# Patient Record
Sex: MALE | Race: BLACK OR AFRICAN AMERICAN | URBAN - METROPOLITAN AREA
[De-identification: names, ages, dates, MRNs, and addresses within clinical notes are randomized per-mention and may not be internally consistent; named-entity substitution may affect disease eponyms.]

---

## 2019-02-08 ENCOUNTER — APPOINTMENT (RX ONLY)
Dept: URBAN - METROPOLITAN AREA CLINIC 64 | Facility: CLINIC | Age: 29
Setting detail: DERMATOLOGY
End: 2019-02-08

## 2019-02-08 DIAGNOSIS — R21 RASH AND OTHER NONSPECIFIC SKIN ERUPTION: ICD-10-CM

## 2019-02-08 PROBLEM — L20.84 INTRINSIC (ALLERGIC) ECZEMA: Status: ACTIVE | Noted: 2019-02-08

## 2019-02-08 PROBLEM — L85.3 XEROSIS CUTIS: Status: ACTIVE | Noted: 2019-02-08

## 2019-02-08 PROBLEM — J45.909 UNSPECIFIED ASTHMA, UNCOMPLICATED: Status: ACTIVE | Noted: 2019-02-08

## 2019-02-08 PROCEDURE — ? COUNSELING

## 2019-02-08 PROCEDURE — ? BIOPSY BY PUNCH METHOD

## 2019-02-08 PROCEDURE — ? PRESCRIPTION

## 2019-02-08 PROCEDURE — 11105 PUNCH BX SKIN EA SEP/ADDL: CPT

## 2019-02-08 PROCEDURE — 11104 PUNCH BX SKIN SINGLE LESION: CPT

## 2019-02-08 RX ORDER — MUPIROCIN 20 MG/G
OINTMENT TOPICAL
Qty: 1 | Refills: 4 | Status: ERX | COMMUNITY
Start: 2019-02-08

## 2019-02-08 RX ORDER — TRIAMCINOLONE ACETONIDE 1 MG/G
CREAM TOPICAL BID
Qty: 1 | Refills: 4 | Status: ERX | COMMUNITY
Start: 2019-02-08

## 2019-02-08 RX ADMIN — TRIAMCINOLONE ACETONIDE 1: 1 CREAM TOPICAL at 00:00

## 2019-02-08 RX ADMIN — MUPIROCIN 1: 20 OINTMENT TOPICAL at 00:00

## 2019-02-08 ASSESSMENT — LOCATION DETAILED DESCRIPTION DERM
LOCATION DETAILED: RIGHT ANTERIOR PROXIMAL THIGH
LOCATION DETAILED: RIGHT DISTAL CALF
LOCATION DETAILED: RIGHT PROXIMAL CALF
LOCATION DETAILED: LEFT PROXIMAL CALF
LOCATION DETAILED: LEFT ANTECUBITAL SKIN
LOCATION DETAILED: RIGHT VENTRAL PROXIMAL FOREARM

## 2019-02-08 ASSESSMENT — LOCATION SIMPLE DESCRIPTION DERM
LOCATION SIMPLE: RIGHT THIGH
LOCATION SIMPLE: LEFT CALF
LOCATION SIMPLE: LEFT ELBOW
LOCATION SIMPLE: RIGHT CALF
LOCATION SIMPLE: RIGHT FOREARM

## 2019-02-08 ASSESSMENT — LOCATION ZONE DERM
LOCATION ZONE: ARM
LOCATION ZONE: LEG

## 2019-02-08 NOTE — PROCEDURE: BIOPSY BY PUNCH METHOD
Anesthesia Volume In Cc (Will Not Render If 0): 0.5
Post-Care Instructions: I reviewed with the patient in detail post-care instructions. Patient is to keep the biopsy site dry overnight, and then apply bacitracin twice daily until healed. Patient may apply hydrogen peroxide soaks to remove any crusting.
Detail Level: Detailed
X Depth Of Punch In Cm (Optional): 0
Epidermal Sutures: 6-0 Ethilon
Punch Size In Mm: 5
Wound Care: Petrolatum
Bill For Surgical Tray: no
Hemostasis: None
Was A Bandage Applied: Yes
Biopsy Type: H and E
Home Suture Removal Text: Patient was provided a home suture removal kit and will remove their sutures at home.  If they have any questions or difficulties they will call the office.
Notification Instructions: Patient will be notified of biopsy results. However, patient instructed to call the office if not contacted within 2 weeks.
Consent: Written consent was obtained and risks were reviewed including but not limited to scarring, infection, bleeding, scabbing, incomplete removal, nerve damage and allergy to anesthesia.
Lab: 6
Lab Facility: 3
Dressing: bandage
Anesthesia Type: 1% lidocaine with epinephrine
Billing Type: Third-Party Bill
Suture Removal: 14 days

## 2019-04-12 ENCOUNTER — APPOINTMENT (RX ONLY)
Dept: URBAN - METROPOLITAN AREA CLINIC 64 | Facility: CLINIC | Age: 29
Setting detail: DERMATOLOGY
End: 2019-04-12

## 2019-04-12 DIAGNOSIS — L40.8 OTHER PSORIASIS: ICD-10-CM

## 2019-04-12 PROCEDURE — ? INVENTORY

## 2019-04-12 PROCEDURE — ? COUNSELING

## 2019-04-12 PROCEDURE — 99213 OFFICE O/P EST LOW 20 MIN: CPT

## 2019-04-12 PROCEDURE — ? PRESCRIPTION

## 2019-04-12 RX ORDER — CALCIPOTRIENE AND BETAMETHASONE DIPROPIONATE 50; .5 UG/G; MG/G
AEROSOL, FOAM TOPICAL
Qty: 1 | Refills: 4 | Status: ERX | COMMUNITY
Start: 2019-04-12

## 2019-04-12 RX ADMIN — CALCIPOTRIENE AND BETAMETHASONE DIPROPIONATE 1: 50; .5 AEROSOL, FOAM TOPICAL at 00:00

## 2019-04-12 ASSESSMENT — LOCATION DETAILED DESCRIPTION DERM
LOCATION DETAILED: LEFT PROXIMAL PRETIBIAL REGION
LOCATION DETAILED: RIGHT PROXIMAL PRETIBIAL REGION

## 2019-04-12 ASSESSMENT — LOCATION SIMPLE DESCRIPTION DERM
LOCATION SIMPLE: RIGHT PRETIBIAL REGION
LOCATION SIMPLE: LEFT PRETIBIAL REGION

## 2019-04-12 ASSESSMENT — LOCATION ZONE DERM: LOCATION ZONE: LEG

## 2019-05-31 ENCOUNTER — APPOINTMENT (RX ONLY)
Dept: URBAN - METROPOLITAN AREA CLINIC 49 | Facility: CLINIC | Age: 29
Setting detail: DERMATOLOGY
End: 2019-05-31

## 2019-05-31 DIAGNOSIS — L40.8 OTHER PSORIASIS: ICD-10-CM

## 2019-05-31 PROCEDURE — ? INTRAMUSCULAR KENALOG

## 2019-05-31 PROCEDURE — 96372 THER/PROPH/DIAG INJ SC/IM: CPT

## 2019-05-31 PROCEDURE — ? COUNSELING

## 2019-05-31 PROCEDURE — ? PRESCRIPTION

## 2019-05-31 RX ORDER — MUPIROCIN 20 MG/G
OINTMENT TOPICAL
Qty: 1 | Refills: 4 | Status: ERX | COMMUNITY
Start: 2019-05-31

## 2019-05-31 RX ORDER — DOXYCYCLINE HYCLATE 100 MG/1
CAPSULE, GELATIN COATED ORAL
Qty: 60 | Refills: 1 | Status: ERX | COMMUNITY
Start: 2019-05-31

## 2019-05-31 RX ADMIN — DOXYCYCLINE HYCLATE 1: 100 CAPSULE, GELATIN COATED ORAL at 00:00

## 2019-05-31 RX ADMIN — MUPIROCIN 1: 20 OINTMENT TOPICAL at 00:00

## 2019-05-31 ASSESSMENT — LOCATION ZONE DERM: LOCATION ZONE: TRUNK

## 2019-05-31 ASSESSMENT — LOCATION SIMPLE DESCRIPTION DERM: LOCATION SIMPLE: LEFT BUTTOCK

## 2019-05-31 ASSESSMENT — LOCATION DETAILED DESCRIPTION DERM: LOCATION DETAILED: LEFT BUTTOCK

## 2019-08-27 ENCOUNTER — APPOINTMENT (RX ONLY)
Dept: URBAN - METROPOLITAN AREA CLINIC 49 | Facility: CLINIC | Age: 29
Setting detail: DERMATOLOGY
End: 2019-08-27

## 2019-08-27 DIAGNOSIS — L40.0 PSORIASIS VULGARIS: ICD-10-CM

## 2019-08-27 PROCEDURE — ? COUNSELING

## 2019-08-27 PROCEDURE — ? OTEZLA INITIATION

## 2019-08-27 PROCEDURE — 99213 OFFICE O/P EST LOW 20 MIN: CPT

## 2019-08-27 PROCEDURE — ? ADDITIONAL NOTES

## 2019-08-27 PROCEDURE — ? PRESCRIPTION

## 2019-08-27 RX ORDER — MUPIROCIN 20 MG/G
OINTMENT TOPICAL
Qty: 1 | Refills: 6 | Status: ERX

## 2019-08-27 RX ORDER — APREMILAST 30 MG/1
TABLET, FILM COATED ORAL
Qty: 30 | Refills: 6 | Status: CANCELLED

## 2019-08-27 ASSESSMENT — LOCATION DETAILED DESCRIPTION DERM: LOCATION DETAILED: LEFT ANTERIOR PROXIMAL THIGH

## 2019-08-27 ASSESSMENT — LOCATION SIMPLE DESCRIPTION DERM: LOCATION SIMPLE: LEFT THIGH

## 2019-08-27 ASSESSMENT — LOCATION ZONE DERM: LOCATION ZONE: LEG

## 2019-08-27 NOTE — PROCEDURE: ADDITIONAL NOTES
Additional Notes: Patient has tried multiple medications to control his psoriasis with no success, including Doxycycline, mupirocin ointment, Enstilar foam and triamcinolone.  Psoriasis continues to worsen even with IM Kenalog injections.
Detail Level: Simple